# Patient Record
Sex: FEMALE | Race: BLACK OR AFRICAN AMERICAN | NOT HISPANIC OR LATINO | URBAN - METROPOLITAN AREA
[De-identification: names, ages, dates, MRNs, and addresses within clinical notes are randomized per-mention and may not be internally consistent; named-entity substitution may affect disease eponyms.]

---

## 2023-05-04 LAB
ABO GROUP (TYPE) IN BLOOD: NORMAL
ALANINE AMINOTRANSFERASE (SGPT) (U/L) IN SER/PLAS: 14 U/L (ref 7–45)
ALBUMIN (G/DL) IN SER/PLAS: 4 G/DL (ref 3.4–5)
ALKALINE PHOSPHATASE (U/L) IN SER/PLAS: 57 U/L (ref 33–110)
ANION GAP IN SER/PLAS: 10 MMOL/L (ref 10–20)
ANTIBODY SCREEN: NORMAL
ASPARTATE AMINOTRANSFERASE (SGOT) (U/L) IN SER/PLAS: 13 U/L (ref 9–39)
BILIRUBIN TOTAL (MG/DL) IN SER/PLAS: 0.4 MG/DL (ref 0–1.2)
CALCIDIOL (25 OH VITAMIN D3) (NG/ML) IN SER/PLAS: 8 NG/ML
CALCIUM (MG/DL) IN SER/PLAS: 9.1 MG/DL (ref 8.6–10.6)
CARBON DIOXIDE, TOTAL (MMOL/L) IN SER/PLAS: 25 MMOL/L (ref 21–32)
CHLORIDE (MMOL/L) IN SER/PLAS: 107 MMOL/L (ref 98–107)
CHORIOGONADOTROPIN (MIU/ML) IN SER/PLAS: 5680 IU/L
CREATININE (MG/DL) IN SER/PLAS: 0.48 MG/DL (ref 0.5–1.05)
CREATININE (MG/DL) IN URINE: 177 MG/DL (ref 20–320)
ERYTHROCYTE DISTRIBUTION WIDTH (RATIO) BY AUTOMATED COUNT: 12.8 % (ref 11.5–14.5)
ERYTHROCYTE MEAN CORPUSCULAR HEMOGLOBIN CONCENTRATION (G/DL) BY AUTOMATED: 33.6 G/DL (ref 32–36)
ERYTHROCYTE MEAN CORPUSCULAR VOLUME (FL) BY AUTOMATED COUNT: 90 FL (ref 80–100)
ERYTHROCYTES (10*6/UL) IN BLOOD BY AUTOMATED COUNT: 4.28 X10E12/L (ref 4–5.2)
ESTIMATED AVERAGE GLUCOSE FOR HBA1C: 103 MG/DL
GFR FEMALE: >90 ML/MIN/1.73M2
GLUCOSE (MG/DL) IN SER/PLAS: 95 MG/DL (ref 74–99)
HEMATOCRIT (%) IN BLOOD BY AUTOMATED COUNT: 38.7 % (ref 36–46)
HEMOGLOBIN (G/DL) IN BLOOD: 13 G/DL (ref 12–16)
HEMOGLOBIN A1C/HEMOGLOBIN TOTAL IN BLOOD: 5.2 %
HEPATITIS B VIRUS SURFACE AG PRESENCE IN SERUM: NONREACTIVE
HEPATITIS C VIRUS AB PRESENCE IN SERUM: NONREACTIVE
HIV 1/ 2 AG/AB SCREEN: NONREACTIVE
LEUKOCYTES (10*3/UL) IN BLOOD BY AUTOMATED COUNT: 6.9 X10E9/L (ref 4.4–11.3)
NRBC (PER 100 WBCS) BY AUTOMATED COUNT: 0 /100 WBC (ref 0–0)
PLATELETS (10*3/UL) IN BLOOD AUTOMATED COUNT: 273 X10E9/L (ref 150–450)
POTASSIUM (MMOL/L) IN SER/PLAS: 3.4 MMOL/L (ref 3.5–5.3)
PROTEIN (MG/DL) IN URINE: 10 MG/DL (ref 5–24)
PROTEIN TOTAL: 6.9 G/DL (ref 6.4–8.2)
PROTEIN/CREATININE (MG/MG) IN URINE: 0.06 MG/MG CREAT (ref 0–0.17)
REFLEX ADDED, ANEMIA PANEL: NORMAL
RH FACTOR: NORMAL
SODIUM (MMOL/L) IN SER/PLAS: 139 MMOL/L (ref 136–145)
SYPHILIS TOTAL AB: NONREACTIVE
UREA NITROGEN (MG/DL) IN SER/PLAS: 7 MG/DL (ref 6–23)

## 2023-05-05 ENCOUNTER — HOSPITAL ENCOUNTER (OUTPATIENT)
Dept: DATA CONVERSION | Facility: HOSPITAL | Age: 28
Discharge: HOME | End: 2023-05-05
Attending: STUDENT IN AN ORGANIZED HEALTH CARE EDUCATION/TRAINING PROGRAM | Admitting: OBSTETRICS & GYNECOLOGY

## 2023-05-05 DIAGNOSIS — O00.102 LEFT TUBAL PREGNANCY WITHOUT INTRAUTERINE PREGNANCY (HHS-HCC): ICD-10-CM

## 2023-05-05 DIAGNOSIS — J45.909 UNSPECIFIED ASTHMA, UNCOMPLICATED (HHS-HCC): ICD-10-CM

## 2023-05-05 DIAGNOSIS — E66.9 OBESITY, UNSPECIFIED: ICD-10-CM

## 2023-05-05 DIAGNOSIS — Z98.890 OTHER SPECIFIED POSTPROCEDURAL STATES: ICD-10-CM

## 2023-05-05 DIAGNOSIS — F17.200 NICOTINE DEPENDENCE, UNSPECIFIED, UNCOMPLICATED: ICD-10-CM

## 2023-05-05 LAB
ABO GROUP (TYPE) IN BLOOD: NORMAL
ABO GROUP (TYPE) IN BLOOD: NORMAL
ALANINE AMINOTRANSFERASE (SGPT) (U/L) IN SER/PLAS: 11 U/L (ref 7–45)
ALBUMIN (G/DL) IN SER/PLAS: 3.8 G/DL (ref 3.4–5)
ALKALINE PHOSPHATASE (U/L) IN SER/PLAS: 48 U/L (ref 33–110)
ANION GAP IN SER/PLAS: 10 MMOL/L (ref 10–20)
ANTIBODY SCREEN: NORMAL
APPEARANCE, URINE: CLEAR
ASPARTATE AMINOTRANSFERASE (SGOT) (U/L) IN SER/PLAS: 14 U/L (ref 9–39)
BASOPHILS (10*3/UL) IN BLOOD BY AUTOMATED COUNT: 0.02 X10E9/L (ref 0–0.1)
BASOPHILS/100 LEUKOCYTES IN BLOOD BY AUTOMATED COUNT: 0.3 % (ref 0–2)
BILIRUBIN TOTAL (MG/DL) IN SER/PLAS: 0.3 MG/DL (ref 0–1.2)
BILIRUBIN, URINE: NEGATIVE
BLOOD, URINE: ABNORMAL
CALCIUM (MG/DL) IN SER/PLAS: 9.5 MG/DL (ref 8.6–10.6)
CARBON DIOXIDE, TOTAL (MMOL/L) IN SER/PLAS: 25 MMOL/L (ref 21–32)
CHLORIDE (MMOL/L) IN SER/PLAS: 107 MMOL/L (ref 98–107)
CHORIOGONADOTROPIN (MIU/ML) IN SER/PLAS: 6733 IU/L
COLOR, URINE: YELLOW
CREATININE (MG/DL) IN SER/PLAS: 0.4 MG/DL (ref 0.5–1.05)
EOSINOPHILS (10*3/UL) IN BLOOD BY AUTOMATED COUNT: 0.09 X10E9/L (ref 0–0.7)
EOSINOPHILS/100 LEUKOCYTES IN BLOOD BY AUTOMATED COUNT: 1.1 % (ref 0–6)
ERYTHROCYTE DISTRIBUTION WIDTH (RATIO) BY AUTOMATED COUNT: 12.8 % (ref 11.5–14.5)
ERYTHROCYTE MEAN CORPUSCULAR HEMOGLOBIN CONCENTRATION (G/DL) BY AUTOMATED: 34.1 G/DL (ref 32–36)
ERYTHROCYTE MEAN CORPUSCULAR VOLUME (FL) BY AUTOMATED COUNT: 88 FL (ref 80–100)
ERYTHROCYTES (10*6/UL) IN BLOOD BY AUTOMATED COUNT: 4.19 X10E12/L (ref 4–5.2)
GFR FEMALE: >90 ML/MIN/1.73M2
GLUCOSE (MG/DL) IN SER/PLAS: 101 MG/DL (ref 74–99)
GLUCOSE, URINE: NEGATIVE MG/DL
HEMATOCRIT (%) IN BLOOD BY AUTOMATED COUNT: 37 % (ref 36–46)
HEMOGLOBIN (G/DL) IN BLOOD: 12.6 G/DL (ref 12–16)
HEMOGLOBIN A2: 3 %
HEMOGLOBIN A: 96.1 %
HEMOGLOBIN F: 0.9 %
HEMOGLOBIN IDENTIFICATION INTERPRETATION: NORMAL
IMMATURE GRANULOCYTES/100 LEUKOCYTES IN BLOOD BY AUTOMATED COUNT: 0.3 % (ref 0–0.9)
KETONES, URINE: ABNORMAL MG/DL
LEUKOCYTE ESTERASE, URINE: NEGATIVE
LEUKOCYTES (10*3/UL) IN BLOOD BY AUTOMATED COUNT: 8 X10E9/L (ref 4.4–11.3)
LYMPHOCYTES (10*3/UL) IN BLOOD BY AUTOMATED COUNT: 2.5 X10E9/L (ref 1.2–4.8)
LYMPHOCYTES/100 LEUKOCYTES IN BLOOD BY AUTOMATED COUNT: 31.3 % (ref 13–44)
MONOCYTES (10*3/UL) IN BLOOD BY AUTOMATED COUNT: 0.35 X10E9/L (ref 0.1–1)
MONOCYTES/100 LEUKOCYTES IN BLOOD BY AUTOMATED COUNT: 4.4 % (ref 2–10)
MUCUS, URINE: NORMAL /LPF
NEUTROPHILS (10*3/UL) IN BLOOD BY AUTOMATED COUNT: 5.02 X10E9/L (ref 1.2–7.7)
NEUTROPHILS/100 LEUKOCYTES IN BLOOD BY AUTOMATED COUNT: 62.6 % (ref 40–80)
NITRITE, URINE: NEGATIVE
NRBC (PER 100 WBCS) BY AUTOMATED COUNT: 0 /100 WBC (ref 0–0)
PATH REVIEW-HGB IDENTIFICATION: NORMAL
PH, URINE: 6 (ref 5–8)
PLATELETS (10*3/UL) IN BLOOD AUTOMATED COUNT: 258 X10E9/L (ref 150–450)
POTASSIUM (MMOL/L) IN SER/PLAS: 3.3 MMOL/L (ref 3.5–5.3)
PROTEIN TOTAL: 6.9 G/DL (ref 6.4–8.2)
PROTEIN, URINE: NEGATIVE MG/DL
RBC, URINE: 2 /HPF (ref 0–5)
RH FACTOR: NORMAL
RH FACTOR: NORMAL
SODIUM (MMOL/L) IN SER/PLAS: 139 MMOL/L (ref 136–145)
SPECIFIC GRAVITY, URINE: 1.02 (ref 1–1.03)
SQUAMOUS EPITHELIAL CELLS, URINE: 8 /HPF
UREA NITROGEN (MG/DL) IN SER/PLAS: 8 MG/DL (ref 6–23)
URINE CULTURE: NORMAL
UROBILINOGEN, URINE: 2 MG/DL (ref 0–1.9)
WBC, URINE: 2 /HPF (ref 0–5)

## 2023-05-06 LAB
CHLAMYDIA TRACH., AMPLIFIED: NEGATIVE
N. GONORRHEA, AMPLIFIED: NEGATIVE

## 2023-05-09 LAB — RUBELLA VIRUS IGG AB: NEGATIVE

## 2023-05-10 LAB
COMPLETE PATHOLOGY REPORT: NORMAL
CONVERTED CLINICAL DIAGNOSIS-HISTORY: NORMAL
CONVERTED FINAL DIAGNOSIS: NORMAL
CONVERTED FINAL REPORT PDF LINK TO COPY AND PASTE: NORMAL
CONVERTED GROSS DESCRIPTION: NORMAL

## 2023-09-07 VITALS
HEART RATE: 79 BPM | DIASTOLIC BLOOD PRESSURE: 73 MMHG | TEMPERATURE: 97.7 F | RESPIRATION RATE: 16 BRPM | WEIGHT: 272.05 LBS | OXYGEN SATURATION: 99 % | SYSTOLIC BLOOD PRESSURE: 117 MMHG | HEIGHT: 68 IN | BODY MASS INDEX: 41.23 KG/M2

## 2023-09-14 NOTE — H&P
History of Present Illness:   Pregnant/Lactating:  ·  Are You Pregnant yes (1)   ·  Are You Currently Breastfeeding no (1)     History Present Illness:  Reason for surgery: ectopic pregnancy   HPI:    26yo  diagnosed with ectopic pregnancy today based on US findings presenting for evaluation from the office. Pt was seen on  by Dr. Randall and was given rec for US for  dating due to unsure LMP. Patient was previously seen in ED 5 days ago for abdominal pain and on/off vaginal spotting for the past week with positive urine pregnancy test. Pt reports 10/10 abdominal pain last night that spontaneously resolved.     Today she reports 3/10 abodmen pain and vaginal spotting. Denies N/V. Pt last ate at 1000 (). This was a desired pregnancy.     TVUS: BMI 44 with certain LMP, bleeding and left pelvic pain.  - The uterus is enlarged secondary to a uterine myoma as noted below.  - The endometrial echo is thickened without a gestational sac  - Both ovaries appear normal with a simple cyst on the left ovary  - Adjacent and clearly separate from the left ovary a 21 x 15 x 20 mm  mass is seen with hyperechoic  thick rim and central clearing. In the center of this a yolk sac is  clearly visible. This is diagnostic of a tubal pregnancy.  - Adjacent to this mass with a serpiginous form mixed echogenicity is  seen consistent with tubal  hemorrhage. This extends approximately 3 cm.  - No other masses are seen  - No free fluid is seen    Beta Hcg as of  5680. Hgb 13.0     GynHx: Menarche began at age 10. She usually has monthly cycles. Last pap 2018, NILM. EABx1. remote h/o chlamydia, trichomonas and HSV1   PMHx: asthma, class III obesity (BMI 44)   PSHx: none  All: NKDA  SHx: deniedx3       Allergies:        Allergies:  ·  No Known Allergies :     Home Medication Review:   Home Medications Reviewed: yes     Impression/Procedure:   ·  Impression and Planned Procedure: diagnostic laparoscopy       ERAS (Enhanced  Recovery After Surgery):  ·  ERAS Patient: no       Vital Signs:  Temperature C: 36.5 degrees C   Temperature F: 97.7 degrees F   Heart Rate: 79 beats per minute   Respiratory Rate: 16 breath per minute   Blood Pressure Systolic: 117 mm/Hg   Blood Pressure Diastolic: 73 mm/Hg     Physical Exam by System:    Constitutional: no acute distress   Eyes: Clear sclera   ENMT: EOMI   Head/Neck: NCAT   Respiratory/Thorax: Breathing comfortably on room  air, lugns CTAB   Cardiovascular: Warm and well perfused, RRR   Gastrointestinal: soft, LLQ>RLQ tenderness to palpation.  Pt tightens abdomen during palpation of abdomen but no apparent guarding or abdomen rigidity noted   Genitourinary: deferred   Musculoskeletal: moving all extremities   Extremities: moving all extremities   Neurological: awake and alert   Breast: deferred   Lymphatic: deferred   Psychological: normal mood and affect     Consent:   COVID-19 Consent:  ·  COVID-19 Risk Consent Surgeon has reviewed key risks related to the risk of stanley COVID-19 and if they contract COVID-19 what the risks are.     Attestation:   Note Completion:  I am a:  Resident/Fellow   Attending Attestation I saw and evaluated the patient.  I personally obtained the key and critical portions of the history and physical exam or was physically present for key and  critical portions performed by the resident/fellow. I reviewed the resident/fellow?s documentation and discussed the patient with the resident/fellow.  I agree with the resident/fellow?s medical decision making as documented in the note.     I personally evaluated the patient on 05-May-2023         Electronic Signatures:  Rosamaria Healy (Resident))  (Signed 05-May-2023 18:41)   Authored: History of Present Illness, Allergies, Home  Medication Review, Impression/Procedure, ERAS, Physical Exam, Consent, Note Completion  Lex Vallejo)  (Signed 05-May-2023 19:18)   Authored: Note Completion   Co-Signer: History of  "Present Illness, Allergies, Home Medication Review, Impression/Procedure, ERAS, Physical Exam, Consent, Note Completion      Last Updated: 05-May-2023 19:18 by Lex Vallejo (MD)    References:  1.  Data Referenced From \"Triage - ED\" 05-May-2023 15:12   "

## 2024-09-10 ENCOUNTER — HOSPITAL ENCOUNTER (EMERGENCY)
Facility: HOSPITAL | Age: 29
Discharge: HOME | End: 2024-09-10
Payer: COMMERCIAL

## 2024-09-10 VITALS
TEMPERATURE: 98.1 F | SYSTOLIC BLOOD PRESSURE: 131 MMHG | RESPIRATION RATE: 18 BRPM | DIASTOLIC BLOOD PRESSURE: 93 MMHG | HEART RATE: 86 BPM | OXYGEN SATURATION: 100 %
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
## 2024-09-10 LAB — GLUCOSE BLD MANUAL STRIP-MCNC: 89 MG/DL (ref 74–99)

## 2024-09-10 PROCEDURE — 99284 EMERGENCY DEPT VISIT MOD MDM: CPT

## 2024-09-10 PROCEDURE — 82947 ASSAY GLUCOSE BLOOD QUANT: CPT

## 2024-09-10 PROCEDURE — 99283 EMERGENCY DEPT VISIT LOW MDM: CPT

## 2024-09-10 RX ORDER — AMOXICILLIN AND CLAVULANATE POTASSIUM 875; 125 MG/1; MG/1
875 TABLET, FILM COATED ORAL 2 TIMES DAILY
Qty: 14 TABLET | Refills: 0 | Status: SHIPPED | OUTPATIENT
Start: 2024-09-10 | End: 2024-09-17

## 2024-09-10 RX ORDER — AMOXICILLIN AND CLAVULANATE POTASSIUM 875; 125 MG/1; MG/1
1 TABLET, FILM COATED ORAL ONCE
Status: DISCONTINUED | OUTPATIENT
Start: 2024-09-10 | End: 2024-09-10 | Stop reason: HOSPADM

## 2024-09-10 ASSESSMENT — PAIN DESCRIPTION - LOCATION: LOCATION: LEG

## 2024-09-10 ASSESSMENT — PAIN DESCRIPTION - ORIENTATION: ORIENTATION: LEFT

## 2024-09-10 ASSESSMENT — COLUMBIA-SUICIDE SEVERITY RATING SCALE - C-SSRS
6. HAVE YOU EVER DONE ANYTHING, STARTED TO DO ANYTHING, OR PREPARED TO DO ANYTHING TO END YOUR LIFE?: NO
1. IN THE PAST MONTH, HAVE YOU WISHED YOU WERE DEAD OR WISHED YOU COULD GO TO SLEEP AND NOT WAKE UP?: NO
2. HAVE YOU ACTUALLY HAD ANY THOUGHTS OF KILLING YOURSELF?: NO

## 2024-09-10 ASSESSMENT — PAIN DESCRIPTION - PAIN TYPE: TYPE: ACUTE PAIN

## 2024-09-10 ASSESSMENT — PAIN SCALES - GENERAL: PAINLEVEL_OUTOF10: 6

## 2024-09-10 ASSESSMENT — PAIN - FUNCTIONAL ASSESSMENT: PAIN_FUNCTIONAL_ASSESSMENT: 0-10

## 2024-09-10 NOTE — ED TRIAGE NOTES
Pt was bit by a dog at her home, caring for the dog and the two got aggressive and fought and she received a bite to the left inner thigh. Pt has bleeding controlled and is sure that both dogs are up to date on vaccines.

## 2024-09-10 NOTE — ED PROVIDER NOTES
HPI   Chief Complaint   Patient presents with    Animal Bite       Patient is a 29-year-old female presenting to the ED following an animal bite.  Patient states she was bit by her brother's dog this afternoon in the region of her left thigh.  She does know the dog is up-to-date with its vaccinations.  Patient denies any pain in the area, but does endorse some discomfort.  She admits to a few bite wounds, and states the one has been bleeding significantly.  She states she is up-to-date with her Tdap having last received it in September of last year.  Patient states she has been able to ambulate without issue.              Patient History   No past medical history on file.  No past surgical history on file.  No family history on file.  Social History     Tobacco Use    Smoking status: Not on file    Smokeless tobacco: Not on file   Substance Use Topics    Alcohol use: Not on file    Drug use: Not on file       Physical Exam   ED Triage Vitals [09/10/24 1718]   Temperature Heart Rate Respirations BP   36.6 °C (97.8 °F) (!) 106 19 132/88      Pulse Ox Temp Source Heart Rate Source Patient Position   97 % Temporal Monitor Sitting      BP Location FiO2 (%)     Left arm --       Physical Exam  Vitals reviewed.   Constitutional:       General: She is not in acute distress.     Appearance: She is not ill-appearing.   Cardiovascular:      Rate and Rhythm: Normal rate and regular rhythm.   Pulmonary:      Effort: Pulmonary effort is normal. No respiratory distress.      Breath sounds: Normal breath sounds.   Musculoskeletal:      Comments: Full passive ROM of the LLE.  Neurovascularly intact and able to ambulate.   Skin:     General: Skin is warm and dry.      Comments: 3 small bite wounds visualized on medial/posterior aspect of left thigh.  1 actively bleeding.  No surrounding erythema or edema.   Neurological:      General: No focal deficit present.      Mental Status: She is alert.           ED Course & MDM   Diagnoses as  of 09/10/24 2109   Dog bite, initial encounter                 No data recorded     Nani Coma Scale Score: 15 (09/10/24 1721 : Lea Lucero RN)                           Medical Decision Making  Patient is a 29-year-old female presenting to the ED following an animal bite.  History was obtained from the patient.  Was bitten the left thigh by her brothers dog this afternoon.  The dog is up-to-date with his vaccinations.  Patient is up-to-date with her Tdap having last received it a year ago.  Denies any pain in the leg, but does endorse some discomfort.  Has been able to ambulate.  On physical exam, patient is sitting comfortably and in no apparent distress.  She has full passive ROM of the LLE.  Neurovascularly intact and able to ambulate.  3 small bite wounds are visualized on the medial/posterior aspect of the left thigh.  1 is actively bleeding.  No surrounding erythema or edema.  Remainder of exam as noted above.  Initial vital sign significant for tachycardia with a heart rate of 106.  Otherwise stable.    No indication for Tdap booster at this time.  Patient did not want anything for pain control while in the ED.  She did have a random episode of feeling sweaty followed by various episodes of nausea/vomiting while sitting in the ED chair.  Point-of-care glucose obtained which was normal at 89.  EKG revealed normal sinus rhythm with possible left atrial enlargement.  No evidence of ST/T-segment changes.  Thought about obtaining a pregnancy test, although patient states she is not concerned about this secondary to having IUD in place.  Patient's bite wounds were significantly cleaned with pressure washes.  Following which, Surgicel was placed on the bleeding wound, 4 x 4's were placed over top, and Curlex was wrapped around her left thigh.  Patient was ordered a dose of Augmentin, although chose to begin taking this tomorrow secondary to the episode she had here in the ED (described above).  She did remain  stable and ready for discharge.  Discharged her with instructions on adequate wound care.  Provided her with supplies to use.  She can take Tylenol/ibuprofen as needed for pain.  Prescription for Augmentin sent to her pharmacy.  Instructed her on the importance she take the full course of this antibiotic.  Return precautions discussed.  Patient is agreeable to the plan and all of her questions were answered to satisfaction.  She was discharged from the ED in stable condition.        Procedure  Procedures     Анна Pineda PA-C  09/10/24 3350

## 2024-09-11 NOTE — DISCHARGE INSTRUCTIONS
Please keep these dressings on, clean, dry, and intact for 24 hours.  You can shower as normally tomorrow evening.  Please apply bacitracin or other antibiotic ointment over top of the wounds and keep them covered throughout the coming days.  I wrote you a prescription for an antibiotic called Augmentin.  Please pick this up from your [facility] pharmacy and begin taking 1 tablet twice daily for the next 7 days.  Is important to take the full course of this antibiotic.  The bite wounds will continue to scab over and improve with time.   no

## 2024-12-12 ENCOUNTER — OFFICE VISIT (OUTPATIENT)
Dept: OBSTETRICS AND GYNECOLOGY | Facility: CLINIC | Age: 29
End: 2024-12-12
Payer: COMMERCIAL

## 2024-12-12 VITALS
BODY MASS INDEX: 42.51 KG/M2 | DIASTOLIC BLOOD PRESSURE: 97 MMHG | SYSTOLIC BLOOD PRESSURE: 143 MMHG | HEIGHT: 68 IN | WEIGHT: 280.5 LBS | HEART RATE: 76 BPM

## 2024-12-12 DIAGNOSIS — T83.32XA INTRAUTERINE CONTRACEPTIVE DEVICE THREADS LOST, INITIAL ENCOUNTER: ICD-10-CM

## 2024-12-12 DIAGNOSIS — Z97.5 IUD (INTRAUTERINE DEVICE) IN PLACE: Primary | ICD-10-CM

## 2024-12-12 PROBLEM — Z30.432 ENCOUNTER FOR IUD REMOVAL: Status: ACTIVE | Noted: 2024-12-12

## 2024-12-12 PROCEDURE — 99213 OFFICE O/P EST LOW 20 MIN: CPT | Mod: GC

## 2024-12-12 PROCEDURE — 99213 OFFICE O/P EST LOW 20 MIN: CPT

## 2024-12-12 PROCEDURE — 3008F BODY MASS INDEX DOCD: CPT

## 2024-12-12 ASSESSMENT — ENCOUNTER SYMPTOMS
PSYCHIATRIC NEGATIVE: 0
HEMATOLOGIC/LYMPHATIC NEGATIVE: 0
GASTROINTESTINAL NEGATIVE: 0
CONSTITUTIONAL NEGATIVE: 0
ENDOCRINE NEGATIVE: 0
EYES NEGATIVE: 0
NEUROLOGICAL NEGATIVE: 0
CARDIOVASCULAR NEGATIVE: 0
ALLERGIC/IMMUNOLOGIC NEGATIVE: 0
RESPIRATORY NEGATIVE: 0
MUSCULOSKELETAL NEGATIVE: 0

## 2024-12-12 ASSESSMENT — PATIENT HEALTH QUESTIONNAIRE - PHQ9
1. LITTLE INTEREST OR PLEASURE IN DOING THINGS: NOT AT ALL
2. FEELING DOWN, DEPRESSED OR HOPELESS: NOT AT ALL
SUM OF ALL RESPONSES TO PHQ9 QUESTIONS 1 AND 2: 0

## 2024-12-12 ASSESSMENT — PAIN SCALES - GENERAL: PAINLEVEL_OUTOF10: 0-NO PAIN

## 2024-12-12 NOTE — PROGRESS NOTES
I saw and evaluated the patient. I personally obtained the key and critical portions of the history and physical exam or was physically present for key and critical portions performed by the resident/fellow. I reviewed the resident/fellow's documentation and discussed the patient with the resident/fellow. I agree with the resident/fellow's medical decision making as documented in the note.    Yeni Harrison MD

## 2024-12-12 NOTE — PROGRESS NOTES
"Gynecology Problem Visit  24    Chief complaint:   Chief Complaint   Patient presents with    Contraception     Patient is here for IUD removal   No pain   No falls   STD declined          Alea Carbajal is a 29 y.o.  here for IUD removal    HPI:   Aquiles IUD placed 2023  Had had a left salpingectomy for an ectopic pregnancy in  and occasionally gets cramps. Noted that they told her she had a small fibroid at that time.   Would like the IUD removed as trying to get pregnant.  Pap smear 2023: NILM    GynHx:  Ob hx: IAB x1 (D&C), ectopic x1  Menses: Menarche 10, Amenorrhea on IUD, previously had regular, 3-4 days, heavy to moderate.   Sexual activity: Yes with her partner  Current contraception: IUD since , used plan B previously   STIs: Remote hx of chlamydia and was treated. Declines STI testing    SocHx:  TAD: smoke 2-3 black 'n milds, 5 shots a week.     Past med hx and past surg hx reviewed and notable for: asthma otherwise in HPI    Objective   BP (!) 143/97 Comment: MD aware  Pulse 76   Ht 1.727 m (5' 8\")   Wt 127 kg (280 lb 8 oz)   BMI 42.65 kg/m²     General: Well appearing, alert  HEENT: normocephalic, EOMI, clear sclera  Cardio: Warm and well perfused  Resp: breathing comfortably on room air  Breast: deferred  Abd: soft, nontender, nondistended  :   External Genitalia: normal morphology, no lesions   Vagina: no abnormal discharge    Cervix: no lesions, very anterior, no IUD strings visualized   Uterus: small, mobile, nontender   Neuro: grossly intact, no focal deficits  Extremities: full ROM, no calf tenderness  Psych: A&O x3, appropriate mood and affect    Assessment     Assessment and Plan:  Alea Carbajal is a 29 y.o. female here for the following concerns we addressed today:    Encounter for IUD removal  - patient consented, would like IUD removed today as wishes to become pregnant  - attempted to remove IUD using alligator clamps, was unsuccessful, strings not " visualized on exam, see procedure note for further details.   - will order TVUS to assess IUD location, discussed potential need for OR removal pending TVUS       Orders Placed This Encounter   Procedures    US OB transvaginal GYN US     Standing Status:   Future     Standing Expiration Date:   12/12/2025     Order Specific Question:   Reason for exam:     Answer:   IUD check     Order Specific Question:   Radiologist to Determine Optimal Study     Answer:   Yes     Order Specific Question:   Release result to [facility]     Answer:   Immediate [1]     Order Specific Question:   Is this exam part of a Research Study? If Yes, link this order to the research study     Answer:   No          RTC after TVUS   Patient seen and discussed with Dr. Russell Removal    Procedure: removal of Liletta IUD  Indications for the removal include desired fertility.  Sexually active.    IUD strings not visualized.  Tenaculum placed; anterior lip of cervix, and the, alligator forceps was used to grasp the IUD without ultrasound guidance and IUD was unable to be removed.       Poornima Rahman MD  PGY-1, Obstetrics and Gynecology

## 2024-12-12 NOTE — ASSESSMENT & PLAN NOTE
- patient consented, would like IUD removed today as wishes to become pregnant  - attempted to remove IUD using alligator clamps, was unsuccessful, strings not visualized on exam, see procedure note for further details.   - will order TVUS to assess IUD location, discussed potential need for OR removal pending TVUS

## 2024-12-13 ENCOUNTER — APPOINTMENT (OUTPATIENT)
Dept: RADIOLOGY | Facility: CLINIC | Age: 29
End: 2024-12-13

## 2024-12-19 ENCOUNTER — HOSPITAL ENCOUNTER (OUTPATIENT)
Dept: RADIOLOGY | Facility: HOSPITAL | Age: 29
Discharge: HOME | End: 2024-12-19
Payer: COMMERCIAL

## 2024-12-19 DIAGNOSIS — Z97.5 IUD (INTRAUTERINE DEVICE) IN PLACE: ICD-10-CM

## 2024-12-19 DIAGNOSIS — T83.32XA INTRAUTERINE CONTRACEPTIVE DEVICE THREADS LOST, INITIAL ENCOUNTER: ICD-10-CM

## 2024-12-19 PROCEDURE — 76830 TRANSVAGINAL US NON-OB: CPT

## 2024-12-19 PROCEDURE — 76856 US EXAM PELVIC COMPLETE: CPT | Performed by: RADIOLOGY

## 2024-12-19 PROCEDURE — 76830 TRANSVAGINAL US NON-OB: CPT | Performed by: RADIOLOGY

## 2024-12-27 ENCOUNTER — APPOINTMENT (OUTPATIENT)
Dept: OBSTETRICS AND GYNECOLOGY | Facility: CLINIC | Age: 29
End: 2024-12-27
Payer: COMMERCIAL

## 2024-12-27 VITALS
WEIGHT: 278.3 LBS | DIASTOLIC BLOOD PRESSURE: 103 MMHG | BODY MASS INDEX: 42.32 KG/M2 | SYSTOLIC BLOOD PRESSURE: 141 MMHG | HEART RATE: 92 BPM

## 2024-12-27 DIAGNOSIS — Z31.69 ENCOUNTER FOR PRECONCEPTION CONSULTATION: ICD-10-CM

## 2024-12-27 DIAGNOSIS — Z30.432 ENCOUNTER FOR IUD REMOVAL: Primary | ICD-10-CM

## 2024-12-27 PROCEDURE — 58301 REMOVE INTRAUTERINE DEVICE: CPT | Performed by: OBSTETRICS & GYNECOLOGY

## 2024-12-27 PROCEDURE — 2500000001 HC RX 250 WO HCPCS SELF ADMINISTERED DRUGS (ALT 637 FOR MEDICARE OP): Performed by: OBSTETRICS & GYNECOLOGY

## 2024-12-27 PROCEDURE — 99212 OFFICE O/P EST SF 10 MIN: CPT | Mod: 25 | Performed by: OBSTETRICS & GYNECOLOGY

## 2024-12-27 PROCEDURE — 99212 OFFICE O/P EST SF 10 MIN: CPT | Performed by: OBSTETRICS & GYNECOLOGY

## 2024-12-27 RX ORDER — IBUPROFEN 600 MG/1
600 TABLET ORAL ONCE
Status: COMPLETED | OUTPATIENT
Start: 2024-12-27 | End: 2024-12-27

## 2024-12-27 ASSESSMENT — PATIENT HEALTH QUESTIONNAIRE - PHQ9
1. LITTLE INTEREST OR PLEASURE IN DOING THINGS: NOT AT ALL
SUM OF ALL RESPONSES TO PHQ9 QUESTIONS 1 AND 2: 0
2. FEELING DOWN, DEPRESSED OR HOPELESS: NOT AT ALL

## 2024-12-27 ASSESSMENT — PAIN SCALES - GENERAL: PAINLEVEL_OUTOF10: 0-NO PAIN

## 2024-12-27 NOTE — PROGRESS NOTES
30 yo  with Mirena IUD x 1 year.  Desires removal due to wants to get pregnant.  1 prior ectopic, 1 prior TAB.  Has not started PNV yet.  Saw Dr. Rahman/Dr. Harrison 2 weeks ago, no string, sent for sono which showed IUD in correct position.    Verbal consent for removal obtained.    The patient was placed in the dorsolithotomy position.  A sterile speculum was placed in the vagina.  No IUD string was visualized.  The cervix was prepped with Betadinex3.  Intracervical block:none0 cc.  The alligator forceps were introduced into the uterine cavity with ultrasound guidance by Dr. lan.  The IUD was grasped and removed intact with a single pull.    No complications.    Patient ID: Alea Carbajal is a 29 y.o. female.    IUD Removal    Performed by: Lora Diego MD MPH  Authorized by: Lora Diego MD MPH    Procedure: IUD removal    Reason for removal: patient request    Strings visualized: no    Cervix cleaned with: iodopovidone    Tenaculum applied to cervix: yes    IUD grasped by forceps: yes    Performed with ultrasound guidance: yes    IUD removed: yes    Date/Time of Removal:  2024 11:18 AM  Removed without complications: yes    IUD intact: yes    Cervix manually dilated: no      Rx PNV.  RTC prn.